# Patient Record
Sex: FEMALE | Race: WHITE | NOT HISPANIC OR LATINO | Employment: OTHER | URBAN - METROPOLITAN AREA
[De-identification: names, ages, dates, MRNs, and addresses within clinical notes are randomized per-mention and may not be internally consistent; named-entity substitution may affect disease eponyms.]

---

## 2017-09-19 ENCOUNTER — THERAPY VISIT (OUTPATIENT)
Dept: PHYSICAL THERAPY | Facility: CLINIC | Age: 67
End: 2017-09-19
Payer: COMMERCIAL

## 2017-09-19 DIAGNOSIS — G89.29 CHRONIC LEFT SHOULDER PAIN: Primary | ICD-10-CM

## 2017-09-19 DIAGNOSIS — M25.512 CHRONIC LEFT SHOULDER PAIN: Primary | ICD-10-CM

## 2017-09-19 PROCEDURE — 97161 PT EVAL LOW COMPLEX 20 MIN: CPT | Mod: GP | Performed by: PHYSICAL THERAPIST

## 2017-09-19 PROCEDURE — 97110 THERAPEUTIC EXERCISES: CPT | Mod: GP | Performed by: PHYSICAL THERAPIST

## 2017-09-19 PROCEDURE — 97530 THERAPEUTIC ACTIVITIES: CPT | Mod: GP | Performed by: PHYSICAL THERAPIST

## 2017-09-19 NOTE — PROGRESS NOTES
[facility] Initial Evaluation          Subjective:    Patient is a 67 year old female presenting with rehab left shoulder hpi.   Promise Devi is a 67 year old female with a left shoulder condition.  Condition occurred with:  Lifting (Pt. has had a gradual onset of L sh pain after lifting some boxes at home 6 months ago. The pain has worsened in recent months prompting pt. to seek help. She had a similar L sh injury 5 years ago that resolved with PT. She's had no problems since then.).  Condition occurred: at home.  This is a chronic condition  6-19-17.    Patient reports pain:  Lateral (deltoid).  Radiates to:  Upper arm.  Pain is described as sharp and is intermittent and reported as 6/10.  Associated symptoms:  Loss of motion/stiffness and loss of strength. Pain is worse in the P.M..  Symptoms are exacerbated by using arm overhead, using arm behind back, lifting and lying on extremity and relieved by rest.  Since onset symptoms are unchanged.  Special testing: none.  Previous treatment: none.    General health as reported by patient is fair.                                              Objective:    Standing Alignment:    Cervical/Thoracic:  Thoracic kyphosis increased, Dowager's hump and forward head  Shoulder/UE:  Rounded shoulders                  Flexibility/Screens:   Positive screens:  Shoulder                             Shoulder Evaluation:  ROM:  AROM:    Flexion:  Left:  130    Right:  150    Abduction:  Left: 120   Right:  155    Internal Rotation:  Left:  80    Right:  80  External Rotation:  Left:  40    Right:  60                      Strength:    Flexion: Left:4/5   Pain:        Abduction:  Left: 4-/5  Pain:        Internal Rotation:  Left:5/5     Pain:      External Rotation:   Left:4/5     Pain:             Stability Testing:  normal      Special Tests:    Left shoulder positive for the following special tests:  Impingement  Left shoulder negative for the following special  tests:  Rotator cuff tear    Palpation:    Left shoulder tenderness present at:  Deltoid    Mobility Tests:    Glenohumeral anterior left:  Hypomobile    Glenohumeral posterior left:  Hypomobile                                               General     ROS    Assessment/Plan:      Patient is a 67 year old female with left side shoulder complaints.    Patient has the following significant findings with corresponding treatment plan.                Diagnosis 1:  L shoulder pain  Pain -  US, self management and education  Decreased ROM/flexibility - manual therapy and therapeutic exercise  Decreased strength - therapeutic exercise and therapeutic activities  Impaired muscle performance - neuro re-education  Decreased function - therapeutic activities    Therapy Evaluation Codes:   1) History comprised of:   Personal factors that impact the plan of care:      Time since onset of symptoms.    Comorbidity factors that impact the plan of care are:      None.     Medications impacting care: None.  2) Examination of Body Systems comprised of:   Body structures and functions that impact the plan of care:      Shoulder.   Activity limitations that impact the plan of care are:      Bathing, Dressing and Lifting.  3) Clinical presentation characteristics are:   Stable/Uncomplicated.  4) Decision-Making    Low complexity using standardized patient assessment instrument and/or measureable assessment of functional outcome.  Cumulative Therapy Evaluation is: Low complexity.    Previous and current functional limitations:  (See Goal Flow Sheet for this information)    Short term and Long term goals: (See Goal Flow Sheet for this information)     Communication ability:  Patient appears to be able to clearly communicate and understand verbal and written communication and follow directions correctly.  Treatment Explanation - The following has been discussed with the patient:   RX ordered/plan of care  Anticipated outcomes  Possible risks  and side effects  This patient would benefit from PT intervention to resume normal activities.   Rehab potential is good.    Frequency:  1 X week, once daily  Duration:  for 6 weeks  Discharge Plan:  Achieve all LTG.  Independent in home treatment program.  Reach maximal therapeutic benefit.    Please refer to the daily flowsheet for treatment today, total treatment time and time spent performing 1:1 timed codes.

## 2017-09-19 NOTE — LETTER
DEPARTMENT OF HEALTH AND HUMAN SERVICES  CENTERS FOR MEDICARE & MEDICAID SERVICES    PLAN/UPDATED PLAN OF PROGRESS FOR OUTPATIENT REHABILITATION    PATIENTS NAME:  Promise Devi   : 1950  PROVIDER NUMBER:    [id number]  [id number]  PROVIDER NAME: [facility] PHYSICAL [facility]  MEDICAL RECORD NUMBER: [id number]   START OF CARE DATE:  SOC Date: 17   TYPE:  PT    PRIMARY/TREATMENT DIAGNOSIS: (Pertinent Medical Diagnosis)  Chronic left shoulder pain    VISITS FROM START OF CARE:  Rxs Used: 1     [facility] Initial Evaluation    Subjective:  Patient is a 67 year old female presenting with rehab left shoulder hpi.   Promise Devi is a 67 year old female with a left shoulder condition.  Condition occurred with:  Lifting (Pt. has had a gradual onset of L sh pain after lifting some boxes at home 6 months ago. The pain has worsened in recent months prompting pt. to seek help. She had a similar L sh injury 5 years ago that resolved with PT. She's had no problems since then.).  Condition occurred: at home.  This is a chronic condition  17.    Patient reports pain:  Lateral (deltoid).  Radiates to:  Upper arm.  Pain is described as sharp and is intermittent and reported as 6/10.  Associated symptoms:  Loss of motion/stiffness and loss of strength. Pain is worse in the P.M..  Symptoms are exacerbated by using arm overhead, using arm behind back, lifting and lying on extremity and relieved by rest.  Since onset symptoms are unchanged.  Special testing: none.  Previous treatment: none.    General health as reported by patient is fair.                              Objective:  Standing Alignment:    Cervical/Thoracic:  Thoracic kyphosis increased, Dowager's hump and forward head  Shoulder/UE:  Rounded shoulders  Flexibility/Screens:   Positive screens:  Shoulder       Shoulder Evaluation:  ROM:  AROM:    Flexion:  Left:  130    Right:  150  Abduction:  Left:  120   Right:  155  Internal Rotation:  Left:  80    Right:  80  External Rotation:  Left:  40    Right:  60  Strength:    Flexion: Left:4/5     Abduction:  Left: 4-/5    Internal Rotation:  Left:5/5        External Rotation:   Left:4/5       Stability Testing:  normal  Special Tests:    Left shoulder positive for the following special tests:  Impingement  Left shoulder negative for the following special tests:  Rotator cuff tear  Palpation:    Left shoulder tenderness present at:  Deltoid  Mobility Tests:    Glenohumeral anterior left:  Hypomobile    Glenohumeral posterior left:  Hypomobile      Assessment/Plan:    Patient is a 67 year old female with left side shoulder complaints.    Patient has the following significant findings with corresponding treatment plan.                Diagnosis 1:  L shoulder pain  Pain -  US, self management and education  PATIENTS NAME:  Promise Devi   : 1950    Decreased ROM/flexibility - manual therapy and therapeutic exercise  Decreased strength - therapeutic exercise and therapeutic activities  Impaired muscle performance - neuro re-education  Decreased function - therapeutic activities    Therapy Evaluation Codes:   1) History comprised of:   Personal factors that impact the plan of care:      Time since onset of symptoms.    Comorbidity factors that impact the plan of care are:      None.     Medications impacting care: None.  2) Examination of Body Systems comprised of:   Body structures and functions that impact the plan of care:      Shoulder.   Activity limitations that impact the plan of care are:      Bathing, Dressing and Lifting.  3) Clinical presentation characteristics are:   Stable/Uncomplicated.  4) Decision-Making    Low complexity using standardized patient assessment instrument and/or measureable assessment of functional outcome.  Cumulative Therapy Evaluation is: Low complexity.    Previous and current functional limitations:  (See Goal Flow Sheet for this  "information)    Short term and Long term goals: (See Goal Flow Sheet for this information)     Communication ability:  Patient appears to be able to clearly communicate and understand verbal and written communication and follow directions correctly.  Treatment Explanation - The following has been discussed with the patient:     RX ordered/plan of care  Anticipated outcomes  Possible risks and side effects  This patient would benefit from PT intervention to resume normal activities.   Rehab potential is good.    Frequency:  1 X week, once daily  Duration:  for 6 weeks  Discharge Plan:  Achieve all LTG.  Independent in home treatment program.  Reach maximal therapeutic benefit.          Caregiver Signature/Credentials _____________________________ Date ________      Treating Provider: Dianna Ramos, PT   I have reviewed and certified the need for these services and plan of treatment while under my care.        PHYSICIAN'S SIGNATURE:   _________________________________________  Date___________    M Kirstin Sanders MD    Certification period:  Beginning of Cert date period: 09/19/17 to  End of Cert period date: 12/17/17     Functional Level Progress Report: Please see attached \"Goal Flow sheet for Functional level.\"    ____X____ Continue Services or       ________ DC Services                Service dates: From  SOC Date: 09/19/17 date to present                         "

## 2017-09-19 NOTE — MR AVS SNAPSHOT
"              After Visit Summary   9/19/2017    Promise Devi    MRN: [id number]           Patient Information     Date Of Birth          1950        Visit Information        Provider Department      9/19/2017 11:30 AM Dianna Ramos PT [facility] Physical Therapy        Today's Diagnoses     Chronic left shoulder pain    -  1       Follow-ups after your visit        Your next 10 appointments already scheduled     Sep 29, 2017 10:50 AM CDT   HILL Extremity with Dian[facility]   [facility] ([facility]  )    [address] [place] [id number] [phone]              Who to contact     If you have questions or need follow up information about today's clinic visit or your schedule please contact [facility] PHYSICAL [facility] directly at [phone].  Normal or non-critical lab and imaging results will be communicated to you by MyChart, letter or phone within 4 business days after the clinic has received the results. If you do not hear from us within 7 days, please contact the clinic through "[employer]hart or phone. If you have a critical or abnormal lab result, we will notify you by phone as soon as possible.  Submit refill requests through [employer] or call your pharmacy and they will forward the refill request to us. Please allow 3 business days for your refill to be completed.          Additional Information About Your Visit        MyChart Information     [employer] lets you send messages to your doctor, view your test results, renew your prescriptions, schedule appointments and more. To sign up, go to [email or web address] . Click on \"Log in\" on the left side of the screen, which will take you to the Welcome page. Then click on \"Sign up Now\" on the right side of the page.     You will be asked to enter the access code listed below, as well as some personal information. Please " follow the directions to create your username and password.     Your access code is: [email or web address]  Expires: 2017  3:18 PM     Your access code will  in 90 days. If you need help or a new code, please call your [facility] clinic or [phone].        Care EveryWhere ID     This is your Care EveryWhere ID. This could be used by other organizations to access your [facility] medical records  [id number]         Blood Pressure from Last 3 Encounters:   No data found for BP    Weight from Last 3 Encounters:   No data found for Wt              We Performed the Following     HILL CERT REPORT     HILL Inital Eval Report     PT Eval, Low Complexity (19172)     Therapeutic Activities     Therapeutic Exercises        Primary Care Provider    None Specified       No primary provider on file.        Equal Access to Services     [employer] : Hadii yun chakrabortyo Sorg, waaxda luqadaha, qaybta kaalmada adeegyada, anatoliy medellin . So [facility] [phone].    ATENCIÓN: Si habla español, tiene a shaw disposición servicios gratuitos de asistencia lingüística. Llame al [phone].    We comply with applicable federal civil rights laws and [place] laws. We do not discriminate on the basis of race, color, national origin, age, disability sex, sexual orientation or gender identity.            Thank you!     Thank you for choosing [facility] PHYSICAL THERAPY  for your care. Our goal is always to provide you with excellent care. Hearing back from our patients is one way we can continue to improve our services. Please take a few minutes to complete the written survey that you may receive in the mail after your visit with us. Thank you!             Your Updated Medication List - Protect others around you: Learn how to safely use, store and throw away your medicines at www.disposemymeds.org.      Notice  As of 2017  3:18 PM    You have not been prescribed any  medications.

## 2017-09-29 ENCOUNTER — THERAPY VISIT (OUTPATIENT)
Dept: PHYSICAL THERAPY | Facility: CLINIC | Age: 67
End: 2017-09-29
Payer: COMMERCIAL

## 2017-09-29 DIAGNOSIS — G89.29 CHRONIC LEFT SHOULDER PAIN: ICD-10-CM

## 2017-09-29 DIAGNOSIS — M25.512 CHRONIC LEFT SHOULDER PAIN: ICD-10-CM

## 2017-09-29 PROCEDURE — 97035 APP MDLTY 1+ULTRASOUND EA 15: CPT | Mod: GP | Performed by: PHYSICAL THERAPIST

## 2017-09-29 PROCEDURE — 97530 THERAPEUTIC ACTIVITIES: CPT | Mod: GP | Performed by: PHYSICAL THERAPIST

## 2017-09-29 PROCEDURE — 97110 THERAPEUTIC EXERCISES: CPT | Mod: GP | Performed by: PHYSICAL THERAPIST

## 2017-09-29 NOTE — MR AVS SNAPSHOT
"              After Visit Summary   9/29/2017    Promise Devi    MRN: [id number]           Patient Information     Date Of Birth          1950        Visit Information        Provider Department      9/29/2017 10:50 AM [facility] Physical [facility]        Today's Diagnoses     Chronic left shoulder pain           Follow-ups after your visit        Your next 10 appointments already scheduled     Oct 12, 2017 10:10 AM CDT   HILL Extremity with Dianna Ramos PT   [facility] Physical Therapy ([facility]  )    [address] [place] [id number] [phone]              Who to contact     If you have questions or need follow up information about today's clinic visit or your schedule [facility] directly at [phone].  Normal or non-critical lab and imaging results will be communicated to you by MyChart, letter or phone within 4 business days after the clinic has received the results. If you do not hear from us within 7 days, please contact the clinic through MyChart or phone. If you have a critical or abnormal lab result, we will notify you by phone as soon as possible.  Submit refill requests through [employer] or call your pharmacy and they will forward the refill request to us. Please allow 3 business days for your refill to be completed.          Additional Information About Your Visit        MyChart Information     [employer] lets you send messages to your doctor, view your test results, renew your prescriptions, schedule appointments and more. To sign up, go to [email or web address] . Click on \"Log in\" on the left side of the screen, which will take you to the Welcome page. Then click on \"Sign up Now\" on the right side of the page.     You will be asked to enter the access code listed below, as well as some personal information. Please follow " the directions to create your username and password.     Your access code is: [email or web address]  Expires: 2017  3:18 PM     Your access code will  in 90 days. If you need help or a new code, please call your [facility] clinic or [phone].        Care EveryWhere ID     This is your Care EveryWhere ID. This could be used by other organizations to access your [facility] medical records  [id number]         Blood Pressure from Last 3 Encounters:   No data found for BP    Weight from Last 3 Encounters:   No data found for Wt              We Performed the Following     Therapeutic Activities     Therapeutic Exercises     Ultrasound Therapy        Primary Care Provider    None Specified       No primary provider on file.        Equal Access to Services     [facility]: Yesica Collazo, ita carey, yon mccoy, anatoliy medellin . So [facility] [phone].    ATENCIÓN: Si habla español, tiene a shaw disposición servicios gratuitos de asistencia lingüística. Llame al [phone].    We comply with applicable federal civil rights laws and [place] laws. We do not discriminate on the basis of race, color, national origin, age, disability, sex, sexual orientation, or gender identity.            Thank you!     Thank you for choosing [facility] PHYSICAL THERAPY  for your care. Our goal is always to provide you with excellent care. Hearing back from our patients is one way we can continue to improve our services. Please take a few minutes to complete the written survey that you may receive in the mail after your visit with us. Thank you!             Your Updated Medication List - Protect others around you: Learn how to safely use, store and throw away your medicines at www.disposemymeds.org.      Notice  As of 2017  1:50 PM    You have not been prescribed any medications.

## 2017-09-29 NOTE — PROGRESS NOTES
Subjective:    HPI                    Objective:    System    Physical Exam    General     ROS    Assessment/Plan:      SUBJECTIVE  Subjective changes as noted by pt:   Pt. was doing better this past week until she took a load of boxes to [employer] 2 days ago. The lifting and carrying aggravated her shoulder slightly.   Current pain level:  5/10   Changes in function:  Yes (See Goal flowsheet attached for changes in current functional level)     Adverse reaction to treatment or activity:  None    OBJECTIVE  Changes in objective findings:  AROM L sh flex 135; abd 124; ER 56. Strength L sh flex 4/5; abd 4/5; ER 4/5     ASSESSMENT  Promise continues to require intervention to meet STG and LTG's: PT  Patient is progressing as expected.  Response to therapy has shown an improvement in  pain level and ROM   Progress made towards STG/LTG?  Yes (See Goal flowsheet attached for updates on achievement of STG and LTG)    PLAN  Current treatment program is being advanced to more complex exercises.    PTA/ATC plan:  N/A    Please refer to the daily flowsheet for treatment today, total treatment time and time spent performing 1:1 timed codes.

## 2017-10-12 ENCOUNTER — THERAPY VISIT (OUTPATIENT)
Dept: PHYSICAL THERAPY | Facility: CLINIC | Age: 67
End: 2017-10-12
Payer: COMMERCIAL

## 2017-10-12 DIAGNOSIS — G89.29 CHRONIC LEFT SHOULDER PAIN: ICD-10-CM

## 2017-10-12 DIAGNOSIS — M25.512 CHRONIC LEFT SHOULDER PAIN: ICD-10-CM

## 2017-10-12 PROCEDURE — 97112 NEUROMUSCULAR REEDUCATION: CPT | Mod: GP | Performed by: PHYSICAL THERAPIST

## 2017-10-12 PROCEDURE — 97110 THERAPEUTIC EXERCISES: CPT | Mod: GP | Performed by: PHYSICAL THERAPIST

## 2017-10-12 PROCEDURE — 97035 APP MDLTY 1+ULTRASOUND EA 15: CPT | Mod: GP | Performed by: PHYSICAL THERAPIST

## 2017-10-12 NOTE — MR AVS SNAPSHOT
"              After Visit Summary   10/12/2017    Promise Devi    MRN: [id number]           Patient Information     Date Of Birth          1950        Visit Information        Provider Department      10/12/2017 10:10 AM Dianna Ramos PT [facility] Physical Therapy        Today's Diagnoses     Chronic left shoulder pain           Follow-ups after your visit        Who to contact     If you have questions or need follow up information about today's clinic visit or your schedule please contact [facility] directly at [phone].  Normal or non-critical lab and imaging results will be communicated to you by [employer], letter or phone within 4 business days after the clinic has received the results. If you do not hear from us within 7 days, please contact the clinic through [employer] or phone. If you have a critical or abnormal lab result, we will notify you by phone as soon as possible.  Submit refill requests through [employer] or call your pharmacy and they will forward the refill request to us. Please allow 3 business days for your refill to be completed.          Additional Information About Your Visit        MyChart Information     [employer] lets you send messages to your doctor, view your test results, renew your prescriptions, schedule appointments and more. To sign up, go to [email or web address] . Click on \"Log in\" on the left side of the screen, which will take you to the Welcome page. Then click on \"Sign up Now\" on the right side of the page.     You will be asked to enter the access code listed below, as well as some personal information. Please follow the directions to create your username and password.     Your access code is: [email or web address]  Expires: 2017  3:18 PM     Your access code will  in 90 days. If you need help or a new code, please call your [place] clinic or [phone].        Care EveryWhere ID  "    This is your Care EveryWhere ID. This could be used by other organizations to access your [place] medical records  [id number]         Blood Pressure from Last 3 Encounters:   No data found for BP    Weight from Last 3 Encounters:   No data found for Wt              We Performed the Following     Neuromuscular Re-Education     Therapeutic Exercises     Ultrasound Therapy        Primary Care Provider    None Specified       No primary provider on file.        Equal Access to Services     [facility]: Hadii aad ku hadrajesho Sojonasali, waaxda luqadaha, qaybta kaalmada nadine, anatoliy linaresrobbinsusana medellin . So [facility] [phone].    ATENCIÓN: Si habla español, tiene a shaw disposición servicios gratuitos de asistencia lingüística. Llame al [phone].    We comply with applicable federal civil rights laws and [place] laws. We do not discriminate on the basis of race, color, national origin, age, disability, sex, sexual orientation, or gender identity.            Thank you!     Thank you for choosing [place] [facility] PHYSICAL THERAPY  for your care. Our goal is always to provide you with excellent care. Hearing back from our patients is one way we can continue to improve our services. Please take a few minutes to complete the written survey that you may receive in the mail after your visit with us. Thank you!             Your Updated Medication List - Protect others around you: Learn how to safely use, store and throw away your medicines at www.disposemymeds.org.      Notice  As of 10/12/2017  3:53 PM    You have not been prescribed any medications.

## 2017-10-12 NOTE — PROGRESS NOTES
Subjective:    HPI                    Objective:    System    Physical Exam    General     ROS    Assessment/Plan:      SUBJECTIVE  Subjective changes as noted by pt:   Pt. reports gradual progress with her L shoulder. She still c/o some pain with lifting and physical use of the arm but the freq and intensity of the pain is decreasing.   Current pain level:  4/10   Changes in function:  Yes (See Goal flowsheet attached for changes in current functional level)     Adverse reaction to treatment or activity:  None    OBJECTIVE  Changes in objective findings: AROM L sh 140; abd 135; ER 62. Strength L sh flex 4+/5; abd 4+/5; ER 4/5     ASSESSMENT  Promise continues to require intervention to meet STG and LTG's: PT  Patient is progressing as expected.  Response to therapy has shown an improvement in  pain level, ROM  and strength  Progress made towards STG/LTG?  Yes (See Goal flowsheet attached for updates on achievement of STG and LTG)    PLAN  Current treatment program is being advanced to more complex exercises.    PTA/ATC plan:  N/A    Please refer to the daily flowsheet for treatment today, total treatment time and time spent performing 1:1 timed codes.

## 2020-01-14 ENCOUNTER — THERAPY VISIT (OUTPATIENT)
Dept: PHYSICAL THERAPY | Facility: CLINIC | Age: 70
End: 2020-01-14
Payer: MEDICARE

## 2020-01-14 DIAGNOSIS — R42 DIZZINESS: ICD-10-CM

## 2020-01-14 DIAGNOSIS — M54.42 ACUTE LEFT-SIDED LOW BACK PAIN WITH LEFT-SIDED SCIATICA: Primary | ICD-10-CM

## 2020-01-14 PROCEDURE — 97161 PT EVAL LOW COMPLEX 20 MIN: CPT | Mod: GP | Performed by: PHYSICAL THERAPIST

## 2020-01-14 PROCEDURE — 97535 SELF CARE MNGMENT TRAINING: CPT | Mod: GP | Performed by: PHYSICAL THERAPIST

## 2020-01-14 PROCEDURE — 97110 THERAPEUTIC EXERCISES: CPT | Mod: GP | Performed by: PHYSICAL THERAPIST

## 2020-01-14 NOTE — LETTER
DEPARTMENT OF HEALTH AND HUMAN SERVICES  CENTERS FOR MEDICARE & MEDICAID SERVICES    PLAN/UPDATED PLAN OF PROGRESS FOR OUTPATIENT REHABILITATION    PATIENTS NAME:  Promise Devi   : 1950  PROVIDER NUMBER:    [id number]  [facility]  [id number]  PROVIDER NAME: [facility]  MEDICAL RECORD NUMBER: [id number]   START OF CARE DATE:  SOC Date: 20   TYPE:  PT  PRIMARY/TREATMENT DIAGNOSIS: (Pertinent Medical Diagnosis)  Acute left-sided low back pain with left-sided sciatica  Dizziness  VISITS FROM START OF CARE:  Rxs Used: 1     [facility] [facility] Initial Evaluation  Subjective:  The history is provided by the patient. No  was used.   Promise Devi being seen for sciaitic and vertigo.   Problem began 2020. Where condition occurred: for unknown reasons.Problem occurred: unsure  and reported as 0/10 on pain scale. General health as reported by patient is fair. Pertinent medical history includes:  Heart problems, high blood pressure, incontinence, menopausal, migraines/headaches, overweight and sleep disorder/apnea.  Medical allergies: adhesives. Surgeries include:  Heart surgery.  Current medications:  Heparin/coumadin and high blood pressure medication. Pain is described as shooting and is intermittent. Pain is the same all the time. Since onset symptoms are rapidly improving.  Patient is retired.   Barriers include:  None as reported by patient.  Red flags:  None as reported by patient.  Type of problem:  Left hip  Condition occurred with:  Insidious onset. This is a new condition   Problem details: 2+ weeks ago started getting pain in L buttock that traveled down the back of the thigh for unknown reasons.  No injury or change in activity.   Patient reports pain:  Posterior. Radiates to:  Thigh. Associated symptoms:  Loss of motion/stiffness. Symptoms are exacerbated by sitting and relieved by nothing.  S:  Promise is a 69 year old patient complaining of dizziness last week that  has since resolved.  Onset of symptoms: 7-10 days ago for unknown reasons.  No recent illnesses or changes in medications      Is this a recurrent problem: no  Progression since onset: resolved  Frequency of episodes/time of day: worse in AM  Duration of episodes: 1 minutes  Exacerbating factors: getting out of bed  Relieving factors: stay still  Previous treatments:  N/A  Special tests/diagnostics performed: none  O:  Cervical ROM screen: Functional AROM of c-spine  Oculomotor/gaze stability screen: negative testing of smooth pursuits, VOR and saccades  Vertebral artery test: negative  PATIENTS NAME:  Promise Devi   : 1950    Hallpike-[place] maneuver:  R: negative  L: negative  Horizontal canal test:  R: negative  L: negative           Objective:  Flexibility/Screens:   Lower Extremity:  Decreased left lower extremity flexibility:Piriformis and Hamstrings  Decreased right lower extremity flexibility:  Piriformis and Hamstrings  Lumbar/SI Evaluation  ROM:  Arom wnl lumbar: no reproduction of pain.  AROM Lumbar:   Flexion:          Min loss  Ext:                    Min to mod loss   Side Bend:        Left:  Min loss    Right:  Min loss  Rotation:           Left:     Right:   Side Glide:        Left:     Right:    Lumbar Myotomes:    T12-L3 (Hip Flex):  Left: 3+    Right: 3+  L2-4 (Quads):  Left:  5    Right:  5  L4 (Ankle DF):  Left:  5    Right:  5  L5 (Great Toe Ext): Left: 5    Right: 5   S1 (Toe Raise):  Left: 5    Right: 5  Lumbar Dermtomes:  normal  Neural Tension/Mobility:    Left side:SLR  negative.   Right side:   SLR  negative.   Lumbar Palpation:    Tenderness not present at Left:    Quadratus Lumborum; Erector Spinae; Piriformis; PSIS or Hamstrings  Tenderness not present at Right:  Quadratus Lumborum; Erector Spinae; Piriformis; PSIS or Hamstrings  Hip Evaluation  Hip Strength:    Extension:  Right: 3/5    -  Pain:    Abduction:  Left: 3/5    -   Pain:Right: 3+/5   -   Pain:  Adduction:  Left: 3+/5    "-   Pain:  Assessment/Plan:    Patient is a 69 year old female with lumbar and vestibular complaints.    Patient has the following significant findings with corresponding treatment plan.                Diagnosis 1:  Sciatica  Decreased ROM/flexibility - therapeutic exercise and home program  Decreased strength - therapeutic exercise and home program  Diagnosis 2:  Vertigo   Dizziness- self management  Cumulative Therapy Evaluation is: Low complexity.  Previous and current functional limitations:  None    Short term and Long term goals: Ind HEP to self manage   Communication ability:  Patient appears to be able to clearly communicate and understand verbal and written communication and follow directions correctly.  Treatment Explanation - The following has been discussed with the patient:   RX ordered/plan of care  Anticipated outcomes  PATIENTS NAME:  Promise Devi   : 1950    Possible risks and side effects  This patient would benefit from PT intervention to resume normal activities.   Rehab potential is good.  Frequency:  1 X week, once daily  Duration:  for 1 visits  Discharge Plan:  Independent in home treatment program.  Please refer to the daily flowsheet for treatment today, total treatment time and time spent performing 1:1 timed codes.         Caregiver Signature/Credentials _____________________________ Date ________      Treating Provider: Osito Rodriguez, PT   I have reviewed and certified the need for these services and plan of treatment while under my care.        PHYSICIAN'S SIGNATURE:   _________________________________________  Date___________   CLAUDE Sanders M.D.    Certification period:  Beginning of Cert date period: 20 to  End of Cert period date: 20     Functional Level Progress Report: Please see attached \"Goal Flow sheet for Functional level.\"    ________ Continue Services or       X________ DC Services                Service dates: From  SOC Date: 20 date to " present

## 2020-01-14 NOTE — PROGRESS NOTES
[place] [facility] Initial Evaluation  Subjective:  The history is provided by the patient. No  was used.   Promise Devi being seen for sciaitic and vertigo.   Problem began 1/9/2020. Where condition occurred: for unknown reasons.Problem occurred: unsure  and reported as 0/10 on pain scale. General health as reported by patient is fair. Pertinent medical history includes:  Heart problems, high blood pressure, incontinence, menopausal, migraines/headaches, overweight and sleep disorder/apnea.  Medical allergies: adhesives.  Surgeries include:  Heart surgery.  Current medications:  Heparin/coumadin and high blood pressure medication.     Pain is described as shooting and is intermittent. Pain is the same all the time. Since onset symptoms are rapidly improving.      Patient is retired.   Barriers include:  None as reported by patient.  Red flags:  None as reported by patient.  Type of problem:  Left hip   Condition occurred with:  Insidious onset. This is a new condition   Problem details: 2+ weeks ago started getting pain in L buttock that traveled down the back of the thigh for unknown reasons.  No injury or change in activity.   Patient reports pain:  Posterior. Radiates to:  Thigh. Associated symptoms:  Loss of motion/stiffness. Symptoms are exacerbated by sitting and relieved by nothing.    S:  Promise is a 69 year old patient complaining of dizziness last week that has since resolved.  Onset of symptoms: 7-10 days ago for unknown reasons.  No recent illnesses or changes in medications      Is this a recurrent problem: no  Progression since onset: resolved  Frequency of episodes/time of day: worse in AM  Duration of episodes: 1 minutes  Exacerbating factors: getting out of bed  Relieving factors: stay still  Previous treatments:  N/A  Special tests/diagnostics performed: none      O:  Cervical ROM screen: Functional AROM of c-spine  Oculomotor/gaze stability screen: negative testing  of smooth pursuits, VOR and saccades  Vertebral artery test: negative  Hallpike-Jasson maneuver:  R: negative  L: negative  Horizontal canal test:  R: negative  L: negative                 Objective:        Flexibility/Screens:       Lower Extremity:  Decreased left lower extremity flexibility:Piriformis and Hamstrings    Decreased right lower extremity flexibility:  Piriformis and Hamstrings               Lumbar/SI Evaluation  ROM:  Arom wnl lumbar: no reproduction of pain.  AROM Lumbar:   Flexion:          Min loss  Ext:                    Min to mod loss   Side Bend:        Left:  Min loss    Right:  Min loss  Rotation:           Left:     Right:   Side Glide:        Left:     Right:           Lumbar Myotomes:    T12-L3 (Hip Flex):  Left: 3+    Right: 3+  L2-4 (Quads):  Left:  5    Right:  5  L4 (Ankle DF):  Left:  5    Right:  5  L5 (Great Toe Ext): Left: 5    Right: 5   S1 (Toe Raise):  Left: 5    Right: 5      Lumbar Dermtomes:  normal                Neural Tension/Mobility:      Left side:SLR  negative.     Right side:   SLR  negative.   Lumbar Palpation:      Tenderness not present at Left:    Quadratus Lumborum; Erector Spinae; Piriformis; PSIS or Hamstrings    Tenderness not present at Right:  Quadratus Lumborum; Erector Spinae; Piriformis; PSIS or Hamstrings                                          Hip Evaluation    Hip Strength:      Extension:  Right: 3/5    -  Pain:    Abduction:  Left: 3/5    -   Pain:Right: 3+/5   -   Pain:  Adduction:  Left: 3+/5   -   Pain:                               General     ROS    Assessment/Plan:    Patient is a 69 year old female with lumbar and vestibular complaints.    Patient has the following significant findings with corresponding treatment plan.                Diagnosis 1:  Sciatica  Decreased ROM/flexibility - therapeutic exercise and home program  Decreased strength - therapeutic exercise and home program  Diagnosis 2:  Vertigo   Dizziness- self  management    Cumulative Therapy Evaluation is: Low complexity.    Previous and current functional limitations:  None    Short term and Long term goals: Ind HEP to self manage     Communication ability:  Patient appears to be able to clearly communicate and understand verbal and written communication and follow directions correctly.  Treatment Explanation - The following has been discussed with the patient:   RX ordered/plan of care  Anticipated outcomes  Possible risks and side effects  This patient would benefit from PT intervention to resume normal activities.   Rehab potential is good.    Frequency:  1 X week, once daily  Duration:  for 1 visits  Discharge Plan:  Independent in home treatment program.    Please refer to the daily flowsheet for treatment today, total treatment time and time spent performing 1:1 timed codes.

## 2021-07-13 ENCOUNTER — RECORDS - HEALTHEAST (OUTPATIENT)
Dept: ADMINISTRATIVE | Facility: CLINIC | Age: 71
End: 2021-07-13

## 2021-07-21 ENCOUNTER — RECORDS - HEALTHEAST (OUTPATIENT)
Dept: ADMINISTRATIVE | Facility: CLINIC | Age: 71
End: 2021-07-21

## 2024-09-13 ENCOUNTER — TRANSCRIBE ORDERS (OUTPATIENT)
Dept: OTHER | Age: 74
End: 2024-09-13

## 2024-09-13 DIAGNOSIS — G89.29 CHRONIC PAIN OF BOTH KNEES: ICD-10-CM

## 2024-09-13 DIAGNOSIS — M25.561 CHRONIC PAIN OF BOTH KNEES: ICD-10-CM

## 2024-09-13 DIAGNOSIS — M54.9 BACK PAIN, UNSPECIFIED BACK LOCATION, UNSPECIFIED BACK PAIN LATERALITY, UNSPECIFIED CHRONICITY: ICD-10-CM

## 2024-09-13 DIAGNOSIS — M25.552 BILATERAL HIP PAIN: Primary | ICD-10-CM

## 2024-09-13 DIAGNOSIS — M25.562 CHRONIC PAIN OF BOTH KNEES: ICD-10-CM

## 2024-09-13 DIAGNOSIS — M25.551 BILATERAL HIP PAIN: Primary | ICD-10-CM

## 2024-10-16 ENCOUNTER — DOCUMENTATION ONLY (OUTPATIENT)
Dept: GERIATRICS | Facility: CLINIC | Age: 74
End: 2024-10-16
Payer: COMMERCIAL

## 2024-10-17 ENCOUNTER — TRANSITIONAL CARE UNIT VISIT (OUTPATIENT)
Dept: GERIATRICS | Facility: CLINIC | Age: 74
End: 2024-10-17
Payer: COMMERCIAL

## 2024-10-17 DIAGNOSIS — F41.9 ANXIETY: ICD-10-CM

## 2024-10-17 DIAGNOSIS — I10 HYPERTENSION, UNSPECIFIED TYPE: ICD-10-CM

## 2024-10-17 DIAGNOSIS — K59.00 CONSTIPATION, UNSPECIFIED CONSTIPATION TYPE: ICD-10-CM

## 2024-10-17 DIAGNOSIS — E11.69 TYPE 2 DIABETES MELLITUS WITH OTHER SPECIFIED COMPLICATION, WITHOUT LONG-TERM CURRENT USE OF INSULIN (H): ICD-10-CM

## 2024-10-17 DIAGNOSIS — I48.20 CHRONIC ATRIAL FIBRILLATION (H): ICD-10-CM

## 2024-10-17 DIAGNOSIS — I63.9 CEREBROVASCULAR ACCIDENT (CVA), UNSPECIFIED MECHANISM (H): Primary | ICD-10-CM

## 2024-10-17 PROCEDURE — 99310 SBSQ NF CARE HIGH MDM 45: CPT | Performed by: NURSE PRACTITIONER

## 2024-10-17 NOTE — PROGRESS NOTES
"[facility] GERIATRICS    PRIMARY CARE PROVIDER AND CLINIC:  Physician No Ref-Primary, No address on file  Chief Complaint   Patient presents with    Hospital F/U      [place] Medical Record Number:  [id number]  Place of Service where encounter took place:  [facility] ([facility]) [[id number]]    Promise Devi  is a 74 year old  (1950), admitted to the above facility from  [facility]. Hospital stay 10/9/24 through 10/15/24..     1. Cerebrovascular accident (CVA), unspecified mechanism (H)    2. Anxiety    3. Chronic atrial fibrillation (H)    4. Hypertension, unspecified type    5. Constipation, unspecified constipation type    6. Type 2 diabetes mellitus with other specified complication, without long-term current use of insulin (H)        HPI:    Patient seen today for initial NP visit in the TCU.  CVA, weakness, encephalopathy. Treated for UTI. Patient with hallucinations/paranoid behavior inpatient.   Appears she has some paranoid behavior today as well.  Denies HA, chest pain, palpitations, dizziness. HR controlled. BP stable so far in TCU. No troubles breathing, no SOB, no Concerns with urination, no constipation. Eating and drinking okay. Sleeping okay. Denies pain.   Reports she does not know where she is or why she is here   Lives at [facility]-reports she moved in a few days ago, ambulates independantly.      CODE STATUS/ADVANCE DIRECTIVES DISCUSSION:  No Order  CPR/Full code   ALLERGIES:   Allergies   Allergen Reactions    Mirtazapine      \"Weird dream\" , sleep walking      PAST MEDICAL HISTORY: No past medical history on file.   PAST SURGICAL HISTORY:   has no past surgical history on file.  FAMILY HISTORY: family history is not on file.  SOCIAL HISTORY:     Patient's living condition: lives in an assisted living facility    Post Discharge Medication Reconciliation Status:   MED REC REQUIRED  Post Medication Reconciliation Status: discharge medications reconciled, continue " medications without change       No current outpatient medications on file.     No current facility-administered medications for this visit.       ROS:  10 point ROS of systems including Constitutional, Eyes, Respiratory, Cardiovascular, Gastroenterology, Genitourinary, Integumentary, Musculoskeletal, Psychiatric were all negative except for pertinent positives noted in my HPI.    Vitals:  There were no vitals taken for this visit.  /75, R 18, T 98.5, P 86, O2 100%, Weight 219.8 pounds  Exam:  GENERAL APPEARANCE:  Alert, in no distress, oriented, cooperative. Comfortable sitting in bed   ENT:  Mouth and posterior oropharynx normal, moist mucous membranes  EYES:  EOM, conjunctivae, lids, pupils and irises normal  NECK:  No adenopathy,masses or thyromegaly  RESP:  respiratory effort and palpation of chest normal, lungs clear to auscultation , no respiratory distress  CV:  Palpation and auscultation of heart done , regular rate and rhythm, no murmur, rub, or gallop, no edema to LE  GI/ABDOMEN:  normal bowel sounds, soft, nontender, no hepatosplenomegaly or other masses  M/S:   moves extremities spontaneously. Ambulation not tested   SKIN:  no rashes to exposed skin.   NEURO:   intact   PSYCH:  oriented X 3, normal insight, judgement and memory, affect and mood suspicious/confused      Lab/Diagnostic data:  Recent labs in [facility] reviewed by me today.   GLUCOSE WB METER 123 (H) 60 - 100 mg/dL   Magnesium   Result Value Ref Range   Magnesium 1.9 1.6 - 2.6 mg/dL   Basic Metab Profile   Result Value Ref Range   Sodium 145 136 - 145 mmol/L   Potassium 3.6 3.4 - 5.1 mmol/L   Chloride 110 (H) 98 - 108 mmol/L   Carbon Dioxide 29 20 - 31 mmol/L   BUN (Urea Nitro) 15 9 - 23 mg/dL   Creatinine 0.87 0.55 - 1.02 mg/dL   Est GFR (CKD-EPI) >60.00 >60.00 mL/min/1.73m2   Glucose 120 (H) 74 - 106 mg/dL   Calcium, Serum 9.8 8.7 - 10.4 mg/dL   Anion Gap 6.0 0.0 - 15.0 mmol/L       ASSESSMENT/PLAN:    (I63.9) Cerebrovascular accident  (CVA), unspecified mechanism (H)  (primary encounter diagnosis)  CT head which showed some cytotoxic edema in the left posterior temporal lobe. Neurology consulted. There was concern that the patient was not taking her Eliquis perfectly consistently and this may have predisposed her to acute ischemic stroke which was found on MRI. Suspected acute left posterior temporal lobe infarct on MRI and this was treated medically.   Restarted on Eliquis, losartan, statin. Follow up with Neuro.    (F41.9) Anxiety  Paranoid behavior  Cognitive decline  Comment: PTA escitalopram. Hospital records reports hallucinations and paranoid behavior. Reports show her sister can calm her down.   Plan: continue , close monitor , Cognitive assessment while in TCU.    (I48.20) Chronic atrial fibrillation (H)  Comment: PTA Eliquis, Metoprolol   Plan: continue, follow up with Cards     (I10) Hypertension, unspecified type  Comment: Losartan, Metop. /75. Patient is overdue for Cards appt.   Plan: Monitor, continue medications. Follow up with Cards     (K59.00) Constipation, unspecified constipation type  Comment: Reports no concerns today.   Plan: Monitor     (E11.69) Type 2 diabetes mellitus with other specified complication, without long-term current use of insulin (H)  Comment: empagliflozin (JARDIANCE) 10 mg oral tablet Take 1 tablet (10 mg) by mouth once daily. Hold during TCU stay, may resume after discharge from TCU , per hospital notes suspect non compliance with medications.   Plan: Monitor BS.     Invasive ductal carcinoma of breast, female, right (HCC)   letrozole (FEMARA) 2.5 mg oral tablet Take 1 tablet (2.5 mg) by mouth once daily.          Electronically signed by:  Bell Garcia CNP     Total time greater than 55 minutes reviewing chart in EPIC and PCC, medications, labs, vitals. Discussing with social work, physical therapy, occupational therapy and nursing.

## 2024-10-17 NOTE — LETTER
" 10/17/2024      Promise Devi  [address] [place] [address]        [facility] GERIATRICS    PRIMARY CARE PROVIDER AND CLINIC:  Physician No Ref-Primary, No address on file  Chief Complaint   Patient presents with     Hospital F/U      [place] Medical Record Number:  [id number]  Place of Service where encounter took place:  [facility] ([facility]) [[id number]]    Promise Devi  is a 74 year old  (1950), admitted to the above facility from  [facility]. Hospital stay 10/9/24 through 10/15/24..     1. Cerebrovascular accident (CVA), unspecified mechanism (H)    2. Anxiety    3. Chronic atrial fibrillation (H)    4. Hypertension, unspecified type    5. Constipation, unspecified constipation type    6. Type 2 diabetes mellitus with other specified complication, without long-term current use of insulin (H)        HPI:    Patient seen today for initial NP visit in the TCU.  CVA, weakness, encephalopathy. Treated for UTI. Patient with hallucinations/paranoid behavior inpatient.   Appears she has some paranoid behavior today as well.  Denies HA, chest pain, palpitations, dizziness. HR controlled. BP stable so far in TCU. No troubles breathing, no SOB, no Concerns with urination, no constipation. Eating and drinking okay. Sleeping okay. Denies pain.   Reports she does not know where she is or why she is here   Lives at [facility]-reports she moved in a few days ago, ambulates independantly.      CODE STATUS/ADVANCE DIRECTIVES DISCUSSION:  No Order  CPR/Full code   ALLERGIES:   Allergies   Allergen Reactions     Mirtazapine      \"Weird dream\" , sleep walking      PAST MEDICAL HISTORY: No past medical history on file.   PAST SURGICAL HISTORY:   has no past surgical history on file.  FAMILY HISTORY: family history is not on file.  SOCIAL HISTORY:     Patient's living condition: lives in an assisted living facility    Post Discharge Medication Reconciliation Status:   MED REC " REQUIRED  Post Medication Reconciliation Status: discharge medications reconciled, continue medications without change       No current outpatient medications on file.     No current facility-administered medications for this visit.       ROS:  10 point ROS of systems including Constitutional, Eyes, Respiratory, Cardiovascular, Gastroenterology, Genitourinary, Integumentary, Musculoskeletal, Psychiatric were all negative except for pertinent positives noted in my HPI.    Vitals:  There were no vitals taken for this visit.  /75, R 18, T 98.5, P 86, O2 100%, Weight 219.8 pounds  Exam:  GENERAL APPEARANCE:  Alert, in no distress, oriented, cooperative. Comfortable sitting in bed   ENT:  Mouth and posterior oropharynx normal, moist mucous membranes  EYES:  EOM, conjunctivae, lids, pupils and irises normal  NECK:  No adenopathy,masses or thyromegaly  RESP:  respiratory effort and palpation of chest normal, lungs clear to auscultation , no respiratory distress  CV:  Palpation and auscultation of heart done , regular rate and rhythm, no murmur, rub, or gallop, no edema to LE  GI/ABDOMEN:  normal bowel sounds, soft, nontender, no hepatosplenomegaly or other masses  M/S:   moves extremities spontaneously. Ambulation not tested   SKIN:  no rashes to exposed skin.   NEURO:   intact   PSYCH:  oriented X 3, normal insight, judgement and memory, affect and mood suspicious/confused      Lab/Diagnostic data:  Recent labs in [facility] reviewed by me today.   GLUCOSE WB METER 123 (H) 60 - 100 mg/dL   Magnesium   Result Value Ref Range   Magnesium 1.9 1.6 - 2.6 mg/dL   Basic Metab Profile   Result Value Ref Range   Sodium 145 136 - 145 mmol/L   Potassium 3.6 3.4 - 5.1 mmol/L   Chloride 110 (H) 98 - 108 mmol/L   Carbon Dioxide 29 20 - 31 mmol/L   BUN (Urea Nitro) 15 9 - 23 mg/dL   Creatinine 0.87 0.55 - 1.02 mg/dL   Est GFR (CKD-EPI) >60.00 >60.00 mL/min/1.73m2   Glucose 120 (H) 74 - 106 mg/dL   Calcium, Serum 9.8 8.7 - 10.4 mg/dL    Anion Gap 6.0 0.0 - 15.0 mmol/L       ASSESSMENT/PLAN:    (I63.9) Cerebrovascular accident (CVA), unspecified mechanism (H)  (primary encounter diagnosis)  CT head which showed some cytotoxic edema in the left posterior temporal lobe. Neurology consulted. There was concern that the patient was not taking her Eliquis perfectly consistently and this may have predisposed her to acute ischemic stroke which was found on MRI. Suspected acute left posterior temporal lobe infarct on MRI and this was treated medically.   Restarted on Eliquis, losartan, statin. Follow up with Neuro.    (F41.9) Anxiety  Paranoid behavior  Cognitive decline  Comment: PTA escitalopram. Hospital records reports hallucinations and paranoid behavior. Reports show her sister can calm her down.   Plan: continue , close monitor , Cognitive assessment while in TCU.    (I48.20) Chronic atrial fibrillation (H)  Comment: PTA Eliquis, Metoprolol   Plan: continue, follow up with Cards     (I10) Hypertension, unspecified type  Comment: Losartan, Metop. /75. Patient is overdue for Cards appt.   Plan: Monitor, continue medications. Follow up with Cards     (K59.00) Constipation, unspecified constipation type  Comment: Reports no concerns today.   Plan: Monitor     (E11.69) Type 2 diabetes mellitus with other specified complication, without long-term current use of insulin (H)  Comment: empagliflozin (JARDIANCE) 10 mg oral tablet Take 1 tablet (10 mg) by mouth once daily. Hold during TCU stay, may resume after discharge from TCU , per hospital notes suspect non compliance with medications.   Plan: Monitor BS.     Invasive ductal carcinoma of breast, female, right (HCC)   letrozole (FEMARA) 2.5 mg oral tablet Take 1 tablet (2.5 mg) by mouth once daily.          Electronically signed by:  Bell Garcia CNP     Total time greater than 55 minutes reviewing chart in EPIC and PCC, medications, labs, vitals. Discussing with social work, physical therapy,  occupational therapy and nursing.                     Sincerely,        Bell Garcia, CNP